# Patient Record
Sex: MALE | Race: WHITE
[De-identification: names, ages, dates, MRNs, and addresses within clinical notes are randomized per-mention and may not be internally consistent; named-entity substitution may affect disease eponyms.]

---

## 2019-11-17 ENCOUNTER — HOSPITAL ENCOUNTER (EMERGENCY)
Dept: HOSPITAL 4 - SED | Age: 49
Discharge: HOME | End: 2019-11-17
Payer: COMMERCIAL

## 2019-11-17 VITALS — BODY MASS INDEX: 40.16 KG/M2 | WEIGHT: 265 LBS | HEIGHT: 68 IN

## 2019-11-17 VITALS — SYSTOLIC BLOOD PRESSURE: 126 MMHG

## 2019-11-17 VITALS — SYSTOLIC BLOOD PRESSURE: 118 MMHG

## 2019-11-17 DIAGNOSIS — T31.0: ICD-10-CM
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
## 2019-11-17 DIAGNOSIS — T24.211A: Primary | ICD-10-CM
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
## 2019-11-17 PROCEDURE — 99284 EMERGENCY DEPT VISIT MOD MDM: CPT

## 2019-11-17 PROCEDURE — 96372 THER/PROPH/DIAG INJ SC/IM: CPT

## 2019-11-17 PROCEDURE — 16020 DRESS/DEBRID P-THICK BURN S: CPT

## 2019-11-17 NOTE — NUR
Patient given written and verbal discharge instructions and verbalizes 
understanding.  ER MD discussed with patient the results and treatment 
provided. Patient in stable condition. ID arm band removed.

Rx of silvadene, motrin given. Patient educated on pain management and to 
follow up with PMD. Pain Scale 3/10.

Opportunity for questions provided and answered. Medication side effect fact 
sheet provided.

## 2019-11-17 NOTE — NUR
Patient is awake, alert, and oriented x4.  Patient reports spilling coffee on 
his left thigh.  Patient presents with burn to left thigh, pain.